# Patient Record
Sex: MALE | Race: WHITE | NOT HISPANIC OR LATINO | URBAN - METROPOLITAN AREA
[De-identification: names, ages, dates, MRNs, and addresses within clinical notes are randomized per-mention and may not be internally consistent; named-entity substitution may affect disease eponyms.]

---

## 2016-05-13 LAB — CRC RECOMMENDATION EXT: NORMAL

## 2017-05-05 ENCOUNTER — HISTORICAL (OUTPATIENT)
Dept: LAB | Facility: HOSPITAL | Age: 72
End: 2017-05-05

## 2018-04-11 ENCOUNTER — HISTORICAL (OUTPATIENT)
Dept: ADMINISTRATIVE | Facility: HOSPITAL | Age: 73
End: 2018-04-11

## 2018-07-18 ENCOUNTER — HISTORICAL (OUTPATIENT)
Dept: ADMINISTRATIVE | Facility: HOSPITAL | Age: 73
End: 2018-07-18

## 2019-02-13 ENCOUNTER — HISTORICAL (OUTPATIENT)
Dept: LAB | Facility: HOSPITAL | Age: 74
End: 2019-02-13

## 2019-02-13 ENCOUNTER — HISTORICAL (OUTPATIENT)
Dept: ADMINISTRATIVE | Facility: HOSPITAL | Age: 74
End: 2019-02-13

## 2019-02-13 LAB
CREAT UR-MCNC: 200 MG/DL
EST. AVERAGE GLUCOSE BLD GHB EST-MCNC: 160 MG/DL
HBA1C MFR BLD: 7.2 % (ref 4.4–6.4)
MICROALBUMIN UR-MCNC: 80 MG/L
MICROALBUMIN/CREAT RATIO PNL UR: ABNORMAL MG/GM
PSA SERPL-MCNC: 1.2 NG/ML (ref 0–6.5)
VIT B12 SERPL-MCNC: 168 PG/ML (ref 193–986)

## 2021-01-19 ENCOUNTER — HISTORICAL (OUTPATIENT)
Dept: ADMINISTRATIVE | Facility: HOSPITAL | Age: 76
End: 2021-01-19

## 2022-04-07 ENCOUNTER — HISTORICAL (OUTPATIENT)
Dept: ADMINISTRATIVE | Facility: HOSPITAL | Age: 77
End: 2022-04-07

## 2022-04-23 VITALS
WEIGHT: 208.31 LBS | BODY MASS INDEX: 31.57 KG/M2 | OXYGEN SATURATION: 97 % | DIASTOLIC BLOOD PRESSURE: 79 MMHG | HEIGHT: 68 IN | SYSTOLIC BLOOD PRESSURE: 134 MMHG

## 2022-05-01 NOTE — HISTORICAL [facility] CERNER
This is a historical note converted from [place]. Formatting and pictures may have been removed.  Please reference [place] for original formatting and attached multimedia. Chief Complaint  Wellness visit  History of Present Illness  Yearly exam.? Reviewed lab with him done January 22, 2019?when he was in the ER with a kidney stone:?CBC-WBC-14.5.? CMP-glucose-271-nonfasting.? Creatinine-1.42?with kidney stone.? UA-greater than 900 RBCs.? States that his blood sugars at home usually run from 120-130.? He has had no hypoglycemic episodes.? Cardiologist increased his Metformin?from?1 daily to twice daily.? He did pass a kidney stone and is feeling well now.? Recommended new shingles vaccine.? We will do lab today for A1c and microalbumin and?PSA?and B12.  Review of Systems  HEENT -?Dr. Aguayo-yearly-eye exam??s/p ?cataracts  CHEST? - no SOB  C/V - Dr. Argueta-every 6 months- CAD/CABG?  GI? - Dr. CURLY Patel colonoscope? 5/13/16? Polyp? repeat 5 yrs.   - nocturia x 1??? Dr. Carlyn Carrington-as needed- kidney stone? 2013?and?2019  M/S -negative  SKIN - psoriasis  ENDOCRINE - DM 2? 5/5/17? A1c - 6.4  LAST CPX 2/13/19  Physical Exam  Vitals & Measurements  HR:?96(Peripheral)? BP:?144/86? SpO2:?97%?  HT:?173?cm? WT:?95.3?kg? BMI:?31.84?  GEN?73-year-old white male. ?Well-developed. ?Well-nourished. ?No acute distress.  SKIN?clear  PULSE?regular  HEENT?normal  NECK?no bruits?  THYROID?normal  CHEST?clear  HEART?regular rate and rhythm without murmur  ABDOMEN?soft nontender no masses  GENITALIA?normal male. ?No hernia.  RECTAL?no masses  PROSTATE?normal  EXTREMITIES?no edema  Diabetic foot exam:?Visual-pes planus.? Pulse-intact.? Sensory-intact.  Assessment/Plan  1.?Medicare annual wellness visit, subsequent?Z00.00  Ordered:   Medicare Subsequent Wellness PC, Medicare annual wellness visit, subsequent  Diabetes mellitus, type 2  Kidney stone, [facility], 02/13/19 15:56:00 CST  ?  2.?Diabetes mellitus, type  2?E11.9  Ordered:  Misc Prescription, Contour Next Test strips, See Instructions, test glucose once daily, # 100 box(es), 3 Refill(s), Pharmacy: [id number]  Clinic Follow up, *Est. 08/13/19 3:00:00 CDT, Order for future visit, Diabetes mellitus, type 2, [facility]   Medicare Subsequent Wellness PC, Medicare annual wellness visit, subsequent  Diabetes mellitus, type 2  Kidney stone, [facility], 02/13/19 15:56:00 CST  Hemoglobin A1c, Routine collect, 02/13/19 15:58:00 CST, Blood, Stop date 02/13/19 15:58:00 CST, Lab Collect, Diabetes mellitus, type 2, 02/13/19 15:58:00 CST  Microalbumin Urine, Routine collect, Urine, 02/13/19 15:58:00 CST, Stop date 02/13/19 15:58:00 CST, Nurse collect, Diabetes mellitus, type 2  Vitamin B12 Level, Routine collect, 02/13/19 15:56:00 CST, Blood, Order for future visit, Stop date 02/13/19 15:56:00 CST, Lab Collect, Diabetes mellitus, type 2, 02/13/19 15:56:00 CST  ?  3.?Kidney stone?N20.0  Ordered:   Medicare Subsequent Wellness PC, Medicare annual wellness visit, subsequent  Diabetes mellitus, type 2  Kidney stone, [facility], 02/13/19 15:56:00 CST  Prostate Specific Antigen, Routine collect, 02/13/19 15:58:00 CST, Blood, Stop date 02/13/19 15:58:00 CST, Lab Collect, Kidney stone, 02/13/19 15:58:00 CST  ?  Orders:  metFORMIN, 500 mg = 1 tab(s), Oral, BID, for diabetes, # 180 tab(s), 3 Refill(s), Pharmacy: [id number]  [facility] Prescription, Contour Next Strips, See Instructions, test once daily, # 100 box(es), 3 Refill(s), Pharmacy: [id number]   Problem List/Past Medical History  Ongoing  CAD (coronary artery disease)  Cataract  Diabetes mellitus, type 2  Hyperlipidemia  Hypertension  Kidney stone  MI - Myocardial infarction  Polyp colon  Psoriasis  Historical  No qualifying data  Procedure/Surgical History  67236 - LT CATARACT W/IOL 1 STA PHACO (Left) (07/18/2018)  77737 - RT CATARACT W/IOL 1 STA PHACO (Right)  (04/11/2018)  cabg x2 (01/01/2006)  cabgx4 (01/01/1994)  angiogram  Cholecystectomy;  colonoscopy  Tonsillectomy and adenoidectomy; age 12 or over   Medications  aspirin 81 mg oral tablet, 81 mg= 1 tab(s), Oral, Daily  CARVEDILOL 3.125 MG TABLET, 3.125 mg= 1 tab(s), Oral, BID  Contour Next Strips, See Instructions, 3 refills  Contour Next Test strips, See Instructions, 3 refills  LOSARTAN POTASSIUM 50 MG TAB, 50 mg= 1 tab(s), Oral, Daily  metFORMIN 500 mg oral tablet, extended release, 500 mg= 1 tab(s), Oral, BID, 3 refills  ROSUVASTATIN CALCIUM 40 MG TAB, 40 mg= 1 tab(s), Oral, Daily  Versed, 1 mg= 1 mL, IV Push, Once  Allergies  No Known Allergies  No Known Medication Allergies  Social History  Alcohol  Current, 1-2 times per year, 02/13/2019  Current, Beer, Liquor, 1-2 times per year, 03/28/2016  Employment/School  Retired, Work/School description: Semi-Retired, desk/warehouse., 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 Exercise type: Walking., 03/26/2018  Home/Environment  Lives with Alone., 04/03/2018  Substance Abuse  Never, 07/18/2018  Tobacco  Former smoker, quit more than 30 days ago, No, 02/13/2019  Former smoker, quit more than 30 days ago, N/A, 01/22/2019  Former smoker, Cigarettes, 03/28/2016  Family History  CAD - Coronary artery disease: Mother.  Cirrhosis of liver: Father.  Hypertension: Mother.  Immunizations  Vaccine Date Status   influenza virus vaccine, inactivated 09/29/2018 Recorded   pneumococcal 23-polyvalent vaccine 05/05/2017 Recorded   pneumococcal 13-valent conjugate vaccine 04/16/2016 Recorded   diphtheria/pertussis, acellular/tetanus 10/19/2009 Recorded   Health Maintenance  Health Maintenance  ???Pending?(in the next year)  ??? ??OverDue  ??? ? ? ?Coronary Artery Disease Maintenance-Antiplatelet Agent Prescribed due??and every?  ??? ? ? ?Coronary Artery Disease Maintenance-Lipid Lowering Therapy due??and every?  ??? ? ? ?Pneumococcal Vaccine due??and every?  ??? ? ? ?Diabetes  Maintenance-Fasting Lipid Profile due??10/02/13??and every 1??year(s)  ??? ? ? ?Aspirin Therapy for CVD Prevention due??03/28/17??and every 1??year(s)  ??? ??Due?  ??? ? ? ?ADL Screening due??02/13/19??and every 1??year(s)  ??? ? ? ?Alcohol Misuse Screening due??02/13/19??and every 1??year(s)  ??? ? ? ?Cognitive Screening due??02/13/19??and every 1??year(s)  ??? ? ? ?Colorectal Screening (Senior Wellness) due??02/13/19??and every?  ??? ? ? ?Diabetes Maintenance-Microalbumin due??02/13/19??Variable frequency  ??? ? ? ?Diabetes Maintenance-Eye Exam due??02/13/19??and every?  ??? ? ? ?Diabetes Maintenance-Foot Exam due??02/13/19??and every?  ??? ? ? ?Diabetes Maintenance-HgbA1c due??02/13/19??and every?  ??? ? ? ?Functional Assessment due??02/13/19??and every 1??year(s)  ??? ? ? ?Geriatric Depression Screening due??02/13/19??and every 1??year(s)  ??? ? ? ?Hypertension Management-Education due??02/13/19??and every 1??year(s)  ??? ? ? ?Smoking Cessation due??02/13/19??Variable frequency  ??? ? ? ?Tetanus Vaccine due??02/13/19??and every 10??year(s)  ??? ? ? ?Zoster Vaccine due??02/13/19??and every 100??year(s)  ??? ??Due In Future?  ??? ? ? ?Advance Directive not due until??07/09/19??and every 1??year(s)  ??? ? ? ?Hypertension Management-BMP not due until??01/22/20??and every 1??year(s)  ??? ? ? ?Diabetes Maintenance-Urine Dipstick not due until??01/22/20??and every 1??year(s)  ??? ? ? ?Fall Risk Assessment not due until??01/22/20??and every 1??year(s)  ??? ? ? ?Diabetes Maintenance-Serum Creatinine not due until??01/22/20??and every 1??year(s)  ???Satisfied?(in the past 1 year)  ??? ??Satisfied?  ??? ? ? ?Advance Directive on??07/09/18.??Satisfied by Rina Gonzales LPN  ??? ? ? ?Blood Pressure Screening on??02/13/19.??Satisfied by Nidia Kaur CMA  ??? ? ? ?Body Mass Index Check on??02/13/19.??Satisfied by Nidia Kaur CMA  ??? ? ? ?Coronary Artery Disease Maintenance-Lipid Lowering Therapy  on??04/03/18.  ??? ? ? ?Diabetes Maintenance-Serum Creatinine on??01/22/19.??Satisfied by Guillermina Rios  ??? ? ? ?Diabetes Screening on??01/22/19.??Satisfied by Guillermina Rios  ??? ? ? ?Fall Risk Assessment on??01/22/19.??Satisfied by Mehran PHIPPS, Sterling JEROME.  ??? ? ? ?Hypertension Management-Blood Pressure on??02/13/19.??Satisfied by Nidia Kaur CMA  ??? ? ? ?Influenza Vaccine on??09/29/18.??Satisfied by Kassie Mora LPN  ??? ? ? ?Obesity Screening on??02/13/19.??Satisfied by Nidia Kaur CMA.  ?  ?      2/13/19 LAB:? B12 - NORMAL.? A1C 7.2.? M/A elevated-80 - on ARB.   SEE ABOVE NOTE - B12 LOW - 168.? ADD B12 1000 MCG DAILY.

## 2022-08-09 ENCOUNTER — DOCUMENTATION ONLY (OUTPATIENT)
Dept: ADMINISTRATIVE | Facility: HOSPITAL | Age: 77
End: 2022-08-09

## 2023-04-11 RX ORDER — METFORMIN HYDROCHLORIDE 500 MG/1
TABLET, EXTENDED RELEASE ORAL
Qty: 360 TABLET | OUTPATIENT
Start: 2023-04-11

## 2023-04-11 NOTE — TELEPHONE ENCOUNTER
Liz seen patient over a year. Due for CPX prior to med refills. Can do labs prior to the appointment.

## 2023-04-19 PROBLEM — I21.9 MYOCARDIAL INFARCTION: Status: RESOLVED | Noted: 2023-04-19 | Resolved: 2023-04-19

## 2023-04-19 PROBLEM — K63.5 POLYP OF COLON: Status: ACTIVE | Noted: 2023-04-19

## 2023-04-19 PROBLEM — I10 HYPERTENSION: Status: ACTIVE | Noted: 2023-04-19

## 2023-04-19 PROBLEM — R80.9 MICROALBUMINURIA: Status: ACTIVE | Noted: 2023-04-19

## 2023-04-19 PROBLEM — E11.9 TYPE 2 DIABETES MELLITUS: Status: ACTIVE | Noted: 2023-04-19

## 2023-04-19 PROBLEM — N20.0 CALCULUS OF KIDNEY: Status: ACTIVE | Noted: 2023-04-19

## 2023-04-19 PROBLEM — L40.9 PSORIASIS: Status: ACTIVE | Noted: 2023-04-19

## 2023-04-19 PROBLEM — I25.10 ARTERIOSCLEROSIS OF CORONARY ARTERY: Status: ACTIVE | Noted: 2023-04-19

## 2023-04-19 PROBLEM — I21.9 MYOCARDIAL INFARCTION: Status: ACTIVE | Noted: 2023-04-19

## 2023-04-19 PROBLEM — E78.5 HYPERLIPIDEMIA: Status: ACTIVE | Noted: 2023-04-19

## 2024-01-17 DIAGNOSIS — Z76.0 MEDICATION REFILL: Primary | ICD-10-CM

## 2024-01-17 RX ORDER — BLOOD SUGAR DIAGNOSTIC
STRIP MISCELLANEOUS
Qty: 100 STRIP | Refills: 3 | Status: SHIPPED | OUTPATIENT
Start: 2024-01-17

## 2025-05-05 PROCEDURE — 82607 VITAMIN B-12: CPT | Performed by: NURSE PRACTITIONER
